# Patient Record
Sex: FEMALE | Race: WHITE | NOT HISPANIC OR LATINO | Employment: OTHER | URBAN - METROPOLITAN AREA
[De-identification: names, ages, dates, MRNs, and addresses within clinical notes are randomized per-mention and may not be internally consistent; named-entity substitution may affect disease eponyms.]

---

## 2017-08-15 ENCOUNTER — NEW PATIENT COMPREHENSIVE (OUTPATIENT)
Dept: URBAN - METROPOLITAN AREA CLINIC 39 | Facility: CLINIC | Age: 62
End: 2017-08-15

## 2017-08-15 DIAGNOSIS — H25.813: ICD-10-CM

## 2017-08-15 DIAGNOSIS — H40.033: ICD-10-CM

## 2017-08-15 DIAGNOSIS — H35.3130: ICD-10-CM

## 2017-08-15 PROCEDURE — 99204 OFFICE O/P NEW MOD 45 MIN: CPT

## 2017-08-15 PROCEDURE — 92015 DETERMINE REFRACTIVE STATE: CPT

## 2017-08-15 PROCEDURE — 92134 CPTRZ OPH DX IMG PST SGM RTA: CPT

## 2017-08-15 ASSESSMENT — VISUAL ACUITY
OD_GLARE: 20/60
OS_SC: J12
OS_SC: 20/60-2
OD_SC: 20/30
OS_GLARE: 20/70
OD_SC: J12

## 2017-08-15 ASSESSMENT — TONOMETRY
OS_IOP_MMHG: 13
OD_IOP_MMHG: 13

## 2017-09-21 NOTE — PATIENT DISCUSSION
(J16.859) Keratoconjunct sicca, not specified as Sjogren's, bilateral - Assesment : Examination revealed Dry Eye Syndrome. - Plan : Monitor for changes. ATs recommended 2-3 times per day. Systane Ultra sample given today.

## 2017-09-21 NOTE — PATIENT DISCUSSION
(Q29.761) Other secondary cataract, bilateral - Assesment : Mild posterior capsule opacification present. - Plan : Monitor for changes. Advised patient to call our office with decreased vision or increased symptoms. Updated GLRx given today.

## 2017-09-21 NOTE — PATIENT DISCUSSION
(H35.363) Drusen (degenerative) of macula, bilateral - Assesment : Examination revealed Drusen along arcades OU. - Plan : Monitor for changes. Advised patient to call our office with decreased vision or increased symptoms. RTC in 1 year for Exam, sooner if problems or changes occur.

## 2019-03-20 NOTE — PATIENT DISCUSSION
(H35.363) Drusen (degenerative) of macula, bilateral - Assesment : Examination revealed macular and familial Drusen OU. Mac OCT attempted, Pt unable today. - Plan : Monitor for macular changes with visits and Mac OCTs. Advised patient to call our office with decreased vision or increased symptoms. RTC in 1 year for Exam and Mac OCT (if able), sooner if problems or changes occur.

## 2019-03-20 NOTE — PATIENT DISCUSSION
(J98.863) Keratoconjunct sicca, not specified as Sjogren's, bilateral - Assesment : Examination revealed Dry Eye Syndrome. Health care aid and family member accompanied Pt to appt today. - Plan : Monitor for changes.  Continue ATs during the day and Gel qhs.

## 2021-10-13 ENCOUNTER — NEW PATIENT COMPREHENSIVE (OUTPATIENT)
Dept: URBAN - METROPOLITAN AREA CLINIC 43 | Facility: CLINIC | Age: 66
End: 2021-10-13

## 2021-10-13 DIAGNOSIS — H35.3132: ICD-10-CM

## 2021-10-13 PROCEDURE — 92134 CPTRZ OPH DX IMG PST SGM RTA: CPT

## 2021-10-13 PROCEDURE — 92004 COMPRE OPH EXAM NEW PT 1/>: CPT

## 2021-10-13 PROCEDURE — 92015 DETERMINE REFRACTIVE STATE: CPT

## 2021-10-13 ASSESSMENT — TONOMETRY
OD_IOP_MMHG: 16
OS_IOP_MMHG: 16

## 2021-10-13 ASSESSMENT — VISUAL ACUITY
OD_SC: 20/50-1
OD_SC: J12
OS_SC: J12
OS_SC: 20/100

## 2023-01-31 ENCOUNTER — APPOINTMENT (RX ONLY)
Dept: URBAN - METROPOLITAN AREA CLINIC 137 | Facility: CLINIC | Age: 68
Setting detail: DERMATOLOGY
End: 2023-01-31

## 2023-01-31 DIAGNOSIS — T07XXXA ABRASION OR FRICTION BURN OF OTHER, MULTIPLE, AND UNSPECIFIED SITES, WITHOUT MENTION OF INFECTION: ICD-10-CM

## 2023-01-31 PROBLEM — S50.912A UNSPECIFIED SUPERFICIAL INJURY OF LEFT FOREARM, INITIAL ENCOUNTER: Status: ACTIVE | Noted: 2023-01-31

## 2023-01-31 PROCEDURE — ? COUNSELING

## 2023-01-31 PROCEDURE — 99212 OFFICE O/P EST SF 10 MIN: CPT

## 2023-01-31 PROCEDURE — ? PRESCRIPTION

## 2023-01-31 RX ORDER — GENTAMICIN SULFATE 1 MG/G
OINTMENT TOPICAL
Qty: 15 | Refills: 1 | Status: ERX | COMMUNITY
Start: 2023-01-31

## 2023-01-31 RX ADMIN — GENTAMICIN SULFATE: 1 OINTMENT TOPICAL at 00:00

## 2023-01-31 ASSESSMENT — LOCATION DETAILED DESCRIPTION DERM: LOCATION DETAILED: LEFT PROXIMAL DORSAL FOREARM

## 2023-01-31 ASSESSMENT — LOCATION ZONE DERM: LOCATION ZONE: ARM

## 2023-01-31 ASSESSMENT — LOCATION SIMPLE DESCRIPTION DERM: LOCATION SIMPLE: LEFT FOREARM
